# Patient Record
Sex: FEMALE | Race: WHITE | NOT HISPANIC OR LATINO | Employment: UNEMPLOYED | URBAN - METROPOLITAN AREA
[De-identification: names, ages, dates, MRNs, and addresses within clinical notes are randomized per-mention and may not be internally consistent; named-entity substitution may affect disease eponyms.]

---

## 2023-10-18 ENCOUNTER — OFFICE VISIT (OUTPATIENT)
Dept: INTERNAL MEDICINE | Facility: CLINIC | Age: 48
End: 2023-10-18
Payer: MEDICAID

## 2023-10-18 VITALS
OXYGEN SATURATION: 96 % | HEIGHT: 68 IN | HEART RATE: 102 BPM | BODY MASS INDEX: 29.7 KG/M2 | DIASTOLIC BLOOD PRESSURE: 82 MMHG | WEIGHT: 196 LBS | SYSTOLIC BLOOD PRESSURE: 116 MMHG | TEMPERATURE: 97 F

## 2023-10-18 DIAGNOSIS — F43.23 ADJUSTMENT REACTION WITH ANXIETY AND DEPRESSION: ICD-10-CM

## 2023-10-18 DIAGNOSIS — E88.810 METABOLIC SYNDROME: ICD-10-CM

## 2023-10-18 DIAGNOSIS — G43.809 OTHER MIGRAINE WITHOUT STATUS MIGRAINOSUS, NOT INTRACTABLE: ICD-10-CM

## 2023-10-18 DIAGNOSIS — R41.3 MEMORY IMPAIRMENT: Primary | ICD-10-CM

## 2023-10-18 PROCEDURE — 99204 OFFICE O/P NEW MOD 45 MIN: CPT | Mod: PBBFAC,PO | Performed by: NURSE PRACTITIONER

## 2023-10-18 PROCEDURE — 99999 PR PBB SHADOW E&M-NEW PATIENT-LVL IV: ICD-10-PCS | Mod: PBBFAC,,, | Performed by: NURSE PRACTITIONER

## 2023-10-18 PROCEDURE — 99214 OFFICE O/P EST MOD 30 MIN: CPT | Mod: S$PBB,,, | Performed by: NURSE PRACTITIONER

## 2023-10-18 PROCEDURE — 99214 PR OFFICE/OUTPT VISIT, EST, LEVL IV, 30-39 MIN: ICD-10-PCS | Mod: S$PBB,,, | Performed by: NURSE PRACTITIONER

## 2023-10-18 PROCEDURE — 99999 PR PBB SHADOW E&M-NEW PATIENT-LVL IV: CPT | Mod: PBBFAC,,, | Performed by: NURSE PRACTITIONER

## 2023-10-18 RX ORDER — GABAPENTIN 300 MG/1
900 CAPSULE ORAL 3 TIMES DAILY
COMMUNITY
Start: 2023-09-21

## 2023-10-18 RX ORDER — DULOXETIN HYDROCHLORIDE 60 MG/1
90 CAPSULE, DELAYED RELEASE ORAL EVERY MORNING
COMMUNITY
Start: 2023-10-03

## 2023-10-18 RX ORDER — GALCANEZUMAB 120 MG/ML
120 INJECTION, SOLUTION SUBCUTANEOUS
COMMUNITY
Start: 2023-10-02

## 2023-10-18 NOTE — PROGRESS NOTES
"Subjective:       Patient ID: Chinyere Mendoza is a 47 y.o. female.    Chief Complaint: Memory Loss and Medication Problem (Monjouro(cholesterol)  / Gabapentin(shakes))    Pt presents to clinic today for medication concerns  New to me, previous PCP at [facility] (Dr. Underwood)  She was previously on mounjaro for metabolic syndrome and can no longer get it filled  She is frustrated because she doesn't understand why the medication was stopped  She had recent labs with normal a1c, never dx type 2 diabetes   She has been off a few months  Weight is stable but she feels more tired off the meds    Also reports she has "whole body jerks" and was started on gabapentin about 1 yr ago  The gabapentin helped the jerks but now she had problems with her memory   She was on 900 mg/day, her neurologist decreased her to 100 mg BID   Seeing Neurologist at [facility] with plans to see neuropsych for continue workup of this    Other medical history includes depression/anxiety, and migraines  Feels like these conditions are stable         /82   Pulse 102   Temp 97 °F (36.1 °C) (Tympanic)   Ht 5' 8" (1.727 m)   Wt 88.9 kg (195 lb 15.8 oz)   SpO2 96%   BMI 29.80 kg/m²     Review of Systems   Constitutional:  Positive for activity change. Negative for appetite change, chills, diaphoresis, fatigue, fever and unexpected weight change.   HENT: Negative.     Respiratory:  Negative for cough and shortness of breath.    Cardiovascular:  Negative for chest pain, palpitations and leg swelling.   Gastrointestinal: Negative.    Genitourinary: Negative.    Musculoskeletal:  Positive for arthralgias.   Skin:  Negative for color change, pallor, rash and wound.   Allergic/Immunologic: Negative for immunocompromised state.   Neurological:  Positive for tremors and weakness. Negative for dizziness and facial asymmetry.        Memory problems   Hematological:  Negative for adenopathy. Does not bruise/bleed easily.   Psychiatric/Behavioral:  " Negative for agitation, behavioral problems and confusion.        Objective:      Physical Exam  Vitals and nursing note reviewed.   Constitutional:       General: She is not in acute distress.     Appearance: Normal appearance. She is well-developed. She is not diaphoretic.   HENT:      Head: Normocephalic and atraumatic.   Cardiovascular:      Rate and Rhythm: Normal rate and regular rhythm.      Heart sounds: Normal heart sounds. No murmur heard.  Pulmonary:      Effort: Pulmonary effort is normal. No respiratory distress.      Breath sounds: Normal breath sounds.   Musculoskeletal:         General: Normal range of motion.   Skin:     General: Skin is warm and dry.      Findings: No rash.   Neurological:      Mental Status: She is alert.   Psychiatric:         Mood and Affect: Mood normal.         Behavior: Behavior normal. Behavior is cooperative.         Thought Content: Thought content normal.         Judgment: Judgment normal.         Assessment:       1. Memory impairment    2. Metabolic syndrome    3. Adjustment reaction with anxiety and depression    4. Other migraine without status migrainosus, not intractable    5. BMI 29.0-29.9,adult        Plan:       Chinyere was seen today for memory loss and medication problem.    Diagnoses and all orders for this visit:    Memory impairment     - Continue wean of gabapentin per neurology.  Continue to follow with neurologist   Metabolic syndrome     - We discussed GLP1 options, due to her self pay, these options would be costly. Pt will consider weight loss clinic  Adjustment reaction with anxiety and depression      - Chronic, stable. Continue to follow with valerie   Other migraine without status migrainosus, not intractable      - Chronic, stable, continue to follow with neurology   BMI 29.0-29.9,adult      Provided pt with names of MD for her to est care with   Continue to follow with specialist  Follow up for worsening or no improvement in symptoms and PRN.

## 2023-11-28 ENCOUNTER — OFFICE VISIT (OUTPATIENT)
Dept: URGENT CARE | Facility: CLINIC | Age: 48
End: 2023-11-28
Payer: MEDICAID

## 2023-11-28 VITALS
HEIGHT: 65 IN | WEIGHT: 190.06 LBS | TEMPERATURE: 98 F | OXYGEN SATURATION: 98 % | HEART RATE: 90 BPM | RESPIRATION RATE: 18 BRPM | SYSTOLIC BLOOD PRESSURE: 112 MMHG | BODY MASS INDEX: 31.67 KG/M2 | DIASTOLIC BLOOD PRESSURE: 72 MMHG

## 2023-11-28 DIAGNOSIS — M54.32 BILATERAL SCIATICA: Primary | ICD-10-CM

## 2023-11-28 DIAGNOSIS — M54.41 LOW BACK PAIN WITH BILATERAL SCIATICA, UNSPECIFIED BACK PAIN LATERALITY, UNSPECIFIED CHRONICITY: ICD-10-CM

## 2023-11-28 DIAGNOSIS — M54.42 LOW BACK PAIN WITH BILATERAL SCIATICA, UNSPECIFIED BACK PAIN LATERALITY, UNSPECIFIED CHRONICITY: ICD-10-CM

## 2023-11-28 DIAGNOSIS — M54.31 BILATERAL SCIATICA: Primary | ICD-10-CM

## 2023-11-28 PROCEDURE — 99203 PR OFFICE/OUTPT VISIT, NEW, LEVL III, 30-44 MIN: ICD-10-PCS | Mod: S$GLB,,, | Performed by: FAMILY MEDICINE

## 2023-11-28 PROCEDURE — 99203 OFFICE O/P NEW LOW 30 MIN: CPT | Mod: S$GLB,,, | Performed by: FAMILY MEDICINE

## 2023-11-28 RX ORDER — PREDNISONE 10 MG/1
TABLET ORAL
Qty: 18 TABLET | Refills: 0 | Status: SHIPPED | OUTPATIENT
Start: 2023-11-28

## 2023-11-28 RX ORDER — DICLOFENAC SODIUM 50 MG/1
50 TABLET, DELAYED RELEASE ORAL 2 TIMES DAILY
Qty: 14 TABLET | Refills: 0 | Status: SHIPPED | OUTPATIENT
Start: 2023-11-28

## 2023-11-28 NOTE — PROGRESS NOTES
"Subjective:      Patient ID: Chinyere Mendoza is a 48 y.o. female.    Vitals:  height is 5' 5.39" (1.661 m) and weight is 86.2 kg (190 lb 0.6 oz). Her oral temperature is 98.4 °F (36.9 °C). Her blood pressure is 112/72 and her pulse is 90. Her respiration is 18 and oxygen saturation is 98%.     Chief Complaint: Back Pain    49 yo female fell on her rear this morning in the kitchen at home and flared up her sciatica. She c/o low back pain that radiates down to legs and feet. Someone had to help her up. Says she has a hx of bad low back pain. Sees her doctor for it. No gi or gu symptoms. Hurts to stand up straight or sit.     Back Pain  This is a new problem. The current episode started today. The problem has been gradually worsening since onset. The pain is present in the lumbar spine. The quality of the pain is described as shooting and stabbing. The pain radiates to the left knee, right foot, right thigh, left foot, left thigh and right knee. The pain is at a severity of 9/10. The symptoms are aggravated by bending, lying down, sitting, stress, urinating, coughing, position, standing and twisting. Stiffness is present All day. Associated symptoms include leg pain, numbness, tingling and weakness. Pertinent negatives include no abdominal pain, bladder incontinence, bowel incontinence, chest pain, dysuria, headaches, paresis, pelvic pain, perianal numbness or weight loss. She has tried nothing for the symptoms. The treatment provided no relief.       Constitution: Negative.   HENT: Negative.     Neck: neck negative.   Cardiovascular: Negative.  Negative for chest pain.   Eyes: Negative.    Respiratory: Negative.     Gastrointestinal: Negative.  Negative for abdominal pain and bowel incontinence.   Genitourinary: Negative.  Negative for dysuria, bladder incontinence and pelvic pain.   Musculoskeletal:  Positive for trauma, back pain and history of spine disorder.   Skin: Negative.    Neurological:  Positive " for numbness. Negative for headaches.   Psychiatric/Behavioral: Negative.        Objective:     Physical Exam   Constitutional: She is oriented to person, place, and time. No distress.   HENT:   Head: Normocephalic.   Ears:   Right Ear: External ear normal.   Left Ear: External ear normal.   Nose: Nose normal.   Mouth/Throat: Mucous membranes are moist. Oropharynx is clear.   Eyes: Conjunctivae are normal.   Neck: Neck supple.   Cardiovascular: Normal rate, regular rhythm, normal heart sounds and normal pulses.   Pulmonary/Chest: Effort normal and breath sounds normal.   Abdominal: Normal appearance. She exhibits no distension. Soft. There is no abdominal tenderness. There is no left CVA tenderness and no right CVA tenderness.   Musculoskeletal:      Comments: Back--tender over the lumbar paraspinal muscles with spasm. Appropriate tenderness. No swelling or discoloration. No warmth. Pain here with all ROM. Straight leg raise slightly positive when sitting.    Neurological: no focal deficit. She is alert and oriented to person, place, and time.   Skin: Skin is warm.         Comments: Normal turgor. No acute focal rash   Psychiatric: Her behavior is normal. Mood, judgment and thought content normal.   Nursing note and vitals reviewed.      Assessment:     1. Bilateral sciatica    2. Low back pain with bilateral sciatica, unspecified back pain laterality, unspecified chronicity        Plan:       Bilateral sciatica    Low back pain with bilateral sciatica, unspecified back pain laterality, unspecified chronicity    Other orders  -     predniSONE (DELTASONE) 10 MG tablet; 2 tabs po bid for 3 days then 1 tab po bid for 3 days  Dispense: 18 tablet; Refill: 0  -     diclofenac (VOLTAREN) 50 MG EC tablet; Take 1 tablet (50 mg total) by mouth 2 (two) times daily.  Dispense: 14 tablet; Refill: 0    SUMMARY:  see hpi. Patient says there is no way she fractured anything with this fall. Says her pain is consistent with her past  sciatica. Added Prednisone and Voltaren. No aggravating activity. Followup with her treating provider. Further management may be needed if her symptoms continues. ER if worsens.       Patient Instructions   Thank you for allowing our team to take care of you today.  You fell today and have aggravating the lower back and are experiencing sciatica which you have had before.  A handout on the diagnosis is attached.   Follow appropriate recommendations. Exercises can be help until your pain and symptoms are better.  Adding a tapering course of Prednisone steroid which you have had before. Also adding Voltaren for inflammation one pill twice a day. If any side effects occur, notify the pharmacy and our office before taking another dose.  No aggravating activities.   Followup with your primary doctor for recheck and further testing/referrals as needed.  If worsens, go to the ER.  Followup here as needed.

## 2023-11-28 NOTE — PATIENT INSTRUCTIONS
Thank you for allowing our team to take care of you today.  You fell today and have aggravating the lower back and are experiencing sciatica which you have had before.  A handout on the diagnosis is attached.   Follow appropriate recommendations. Exercises can be help until your pain and symptoms are better.  Adding a tapering course of Prednisone steroid which you have had before. Also adding Voltaren for inflammation one pill twice a day. If any side effects occur, notify the pharmacy and our office before taking another dose.  No aggravating activities.   Followup with your primary doctor for recheck and further testing/referrals as needed.  If worsens, go to the ER.  Followup here as needed.

## 2023-11-29 RX ORDER — DICLOFENAC SODIUM 50 MG/1
50 TABLET, DELAYED RELEASE ORAL 2 TIMES DAILY
Qty: 14 TABLET | Refills: 0 | OUTPATIENT
Start: 2023-11-29

## 2024-05-08 ENCOUNTER — TELEPHONE (OUTPATIENT)
Dept: NEUROLOGY | Facility: CLINIC | Age: 49
End: 2024-05-08
Payer: MEDICAID
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
## 2024-07-22 ENCOUNTER — OFFICE VISIT (OUTPATIENT)
Dept: NEUROLOGY | Facility: CLINIC | Age: 49
End: 2024-07-22
Payer: MEDICAID

## 2024-07-22 DIAGNOSIS — F33.1 MODERATE EPISODE OF RECURRENT MAJOR DEPRESSIVE DISORDER: Primary | ICD-10-CM

## 2024-07-22 PROCEDURE — 99499 UNLISTED E&M SERVICE: CPT | Mod: 95,,, | Performed by: STUDENT IN AN ORGANIZED HEALTH CARE EDUCATION/TRAINING PROGRAM

## 2024-07-22 PROCEDURE — 90791 PSYCH DIAGNOSTIC EVALUATION: CPT | Mod: 95,,, | Performed by: STUDENT IN AN ORGANIZED HEALTH CARE EDUCATION/TRAINING PROGRAM

## 2024-08-26 ENCOUNTER — OFFICE VISIT (OUTPATIENT)
Dept: NEUROLOGY | Facility: CLINIC | Age: 49
End: 2024-08-26
Payer: MEDICAID

## 2024-08-26 DIAGNOSIS — F33.1 MAJOR DEPRESSIVE DISORDER, RECURRENT EPISODE, MODERATE WITH ANXIOUS DISTRESS: Primary | ICD-10-CM

## 2024-08-26 DIAGNOSIS — R41.9 COGNITIVE COMPLAINTS: ICD-10-CM

## 2024-08-26 PROCEDURE — 99211 OFF/OP EST MAY X REQ PHY/QHP: CPT | Mod: PBBFAC | Performed by: STUDENT IN AN ORGANIZED HEALTH CARE EDUCATION/TRAINING PROGRAM

## 2024-08-26 PROCEDURE — 99999 PR PBB SHADOW E&M-EST. PATIENT-LVL I: CPT | Mod: PBBFAC,,, | Performed by: STUDENT IN AN ORGANIZED HEALTH CARE EDUCATION/TRAINING PROGRAM
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
## 2024-09-12 ENCOUNTER — OFFICE VISIT (OUTPATIENT)
Dept: NEUROLOGY | Facility: CLINIC | Age: 49
End: 2024-09-12

## 2024-09-12 DIAGNOSIS — F33.1 MAJOR DEPRESSIVE DISORDER, RECURRENT EPISODE, MODERATE WITH ANXIOUS DISTRESS: Primary | ICD-10-CM

## 2024-09-12 NOTE — PROGRESS NOTES
Ms. Mendoza attended a tele-health (audio and video) feedback session to discuss the results from her recent neuropsychological testing (see report dated 08/26/2024). We discussed her test results, diagnoses, and my recommendations. In particular I emphasized the value of continued work with her mental health team and that she discuss options for intensive outpatient services with her therapist. Due to concerns about suicide risk, I also discussed Ms. Mendoza's risk with her. She reported that she feels safe at this time, is not actively considering suicide, and is motivated to engage with clinical services to reduce her future risk. She discussed a good working relationship with her treating medical psychologist, Dr. Porter. Ms. Mendoza discussed that she will be seeing Dr. Porter again next week at which time she intends to discuss future care directions and the results of this assessment.       Ms. Mendoza voiced her understanding of the information provided, and voiced her intention to follow through on the recommendations provided. All questions were answered to her satisfaction. She was encouraged to contact our office with any future questions or concerns.       Billing Documentation     Time on review of neuropsychological test data and relevant records, data interpretation, providing feedback about these results, and writing/ documentation: 34 minutes; [id number]          _____________________  Davis Griffin, Ph.D.  Neuropsychologist  Department of Neuropsychology  [facility], [place]